# Patient Record
Sex: MALE | Race: WHITE | HISPANIC OR LATINO | Employment: FULL TIME | URBAN - METROPOLITAN AREA
[De-identification: names, ages, dates, MRNs, and addresses within clinical notes are randomized per-mention and may not be internally consistent; named-entity substitution may affect disease eponyms.]

---

## 2019-11-12 ENCOUNTER — OFFICE VISIT (OUTPATIENT)
Dept: FAMILY MEDICINE | Facility: CLINIC | Age: 44
End: 2019-11-12
Payer: OTHER GOVERNMENT

## 2019-11-12 VITALS
OXYGEN SATURATION: 98 % | WEIGHT: 246.19 LBS | RESPIRATION RATE: 16 BRPM | TEMPERATURE: 97 F | SYSTOLIC BLOOD PRESSURE: 140 MMHG | HEART RATE: 82 BPM | BODY MASS INDEX: 33.35 KG/M2 | DIASTOLIC BLOOD PRESSURE: 92 MMHG | HEIGHT: 72 IN

## 2019-11-12 DIAGNOSIS — E11.9 TYPE 2 DIABETES MELLITUS WITHOUT COMPLICATION, WITHOUT LONG-TERM CURRENT USE OF INSULIN: ICD-10-CM

## 2019-11-12 DIAGNOSIS — E78.5 HYPERLIPIDEMIA, UNSPECIFIED HYPERLIPIDEMIA TYPE: ICD-10-CM

## 2019-11-12 DIAGNOSIS — E55.9 VITAMIN D DEFICIENCY: ICD-10-CM

## 2019-11-12 DIAGNOSIS — E03.9 HYPOTHYROIDISM, UNSPECIFIED TYPE: ICD-10-CM

## 2019-11-12 DIAGNOSIS — Z23 INFLUENZA VACCINE NEEDED: ICD-10-CM

## 2019-11-12 DIAGNOSIS — Z00.00 WELLNESS EXAMINATION: Primary | ICD-10-CM

## 2019-11-12 DIAGNOSIS — E53.8 B12 DEFICIENCY: ICD-10-CM

## 2019-11-12 DIAGNOSIS — Z76.89 ENCOUNTER TO ESTABLISH CARE: ICD-10-CM

## 2019-11-12 DIAGNOSIS — I10 HTN (HYPERTENSION), BENIGN: ICD-10-CM

## 2019-11-12 PROCEDURE — 99386 PREV VISIT NEW AGE 40-64: CPT | Mod: S$GLB,,, | Performed by: FAMILY MEDICINE

## 2019-11-12 PROCEDURE — 99386 PR PREVENTIVE VISIT,NEW,40-64: ICD-10-PCS | Mod: S$GLB,,, | Performed by: FAMILY MEDICINE

## 2019-11-12 NOTE — PROGRESS NOTES
Subjective:       Patient ID: Benson Lopez is a 44 y.o. male.    Chief Complaint: Establish Care (pt is here to establish care. )    45 yo M here to establish care and a wellness exam. No pMHx, no medications. Seen by PCP in [place].   Pt has no issues whatsoever. Pt's BP is slightly elevated today but he usually has well controlled BP. On second take, his BP has normalized although they are different on bilateral arms (ie right: 130/80, left: 120/80) He usually runs and works out and his HR and BP are great.   Pt okay to get the flu vaccination today    Review of Systems   Constitutional: Negative for chills, fatigue and fever.   HENT: Negative for congestion, drooling, sneezing and sore throat.    Eyes: Negative for pain and visual disturbance.   Respiratory: Negative for cough and shortness of breath.    Cardiovascular: Negative for chest pain.   Gastrointestinal: Negative for abdominal pain, constipation, diarrhea and nausea.   Endocrine: Negative for cold intolerance and heat intolerance.   Genitourinary: Negative for difficulty urinating and frequency.   Musculoskeletal: Negative for myalgias.   Allergic/Immunologic: Negative for food allergies.   Neurological: Negative for seizures and headaches.   Psychiatric/Behavioral: Negative for behavioral problems.       Objective:      Physical Exam   Constitutional: He appears well-developed.   HENT:   Right Ear: External ear normal.   Left Ear: External ear normal.   Mouth/Throat: Oropharynx is clear and moist.   Eyes: Conjunctivae and EOM are normal.   Neck: Normal range of motion.   Cardiovascular: Normal rate, regular rhythm and intact distal pulses.   Pulmonary/Chest: Effort normal and breath sounds normal.   Abdominal: Soft.   Musculoskeletal: Normal range of motion.   Neurological: He is alert.   Skin: Skin is warm. Capillary refill takes less than 2 seconds.   Psychiatric: He has a normal mood and affect.   Nursing note and vitals reviewed.      Assessment:        1. Wellness examination    2. Encounter to establish care    3. Influenza vaccine needed    4. HTN (hypertension), benign    5. Hyperlipidemia, unspecified hyperlipidemia type    6. Hypothyroidism, unspecified type    7. Vitamin D deficiency    8. Type 2 diabetes mellitus without complication, without long-term current use of insulin    9. B12 deficiency        Plan:       PROBLEM ALPHONSO Knowles was seen today for establish care.    Diagnoses and all orders for this visit:    Wellness examination  -     CBC auto differential; Future  -     Comprehensive metabolic panel; Future  -     Lipid panel; Future  -     Vitamin D; Future  -     Hemoglobin A1c; Future  -     TSH; Future  -     Vitamin B12; Future  -     CBC auto differential  -     Comprehensive metabolic panel  -     Lipid panel  -     Vitamin D  -     Hemoglobin A1c  -     TSH  -     Vitamin B12    Encounter to establish care    Influenza vaccine needed  -     influenza (AFLURIA, FLULAVAL, FLUARIX QUADRIVALENT) vaccine 0.5 mL    HTN (hypertension), benign  -     CBC auto differential; Future  -     Comprehensive metabolic panel; Future  -     CBC auto differential  -     Comprehensive metabolic panel    Hyperlipidemia, unspecified hyperlipidemia type  -     Lipid panel; Future  -     Lipid panel    Hypothyroidism, unspecified type  -     TSH; Future  -     TSH    Vitamin D deficiency  -     Vitamin D; Future  -     Vitamin D    Type 2 diabetes mellitus without complication, without long-term current use of insulin  -     Hemoglobin A1c; Future  -     Hemoglobin A1c    B12 deficiency  -     Vitamin B12; Future  -     Vitamin B12

## 2019-11-14 LAB
25(OH)D3 SERPL-MCNC: 31 NG/ML
ALBUMIN SERPL BCP-MCNC: 4.3 G/DL (ref 3.4–5)
ALP SERPL-CCNC: 80 U/L (ref 45–117)
ALT SERPL W P-5'-P-CCNC: 50 U/L (ref 16–61)
ANION GAP SERPL CALC-SCNC: 7 MMOL/L (ref 3–11)
AST SERPL-CCNC: 29 U/L (ref 15–37)
BASOPHILS NFR BLD: 0 10*3/UL (ref 0–0.3)
BASOPHILS NFR SNV MANUAL: 0.5 % (ref 0–3)
BILIRUB SERPL-MCNC: 0.9 MG/DL (ref 0.2–1)
BUN SERPL-MCNC: 17 MG/DL (ref 7–18)
CALCIUM SERPL-MCNC: 9.6 MG/DL (ref 8.5–10.1)
CHLORIDE SERPL-SCNC: 103 MMOL/L (ref 98–107)
CHOLEST SERPL-MSCNC: 225 MG/DL
CO2 SERPL-SCNC: 32 MMOL/L (ref 21–32)
CREAT SERPL-MCNC: 1.17 MG/DL (ref 0.7–1.3)
EOSINOPHIL NFR BLD: 0 10*3/UL (ref 0–0.6)
EOSINOPHIL NFR SNV MANUAL: 0.5 % (ref 0–6)
ERYTHROCYTE [DISTWIDTH] IN BLOOD BY AUTOMATED COUNT: 12.7 % (ref 0–15.5)
ESTIMATED AVG GLUCOSE: 84 MG/DL
GFR ESTIMATION: > 60
GLUCOSE SERPL-MCNC: 100 MG/DL (ref 74–106)
HBA1C MFR BLD: 5.1 % (ref 4.2–6.3)
HCT VFR BLD AUTO: 45.1 % (ref 42–52)
HDLC SERPL-MCNC: 41 MG/DL
HGB BLD-MCNC: 15.7 G/DL (ref 14–18)
IMMATURE GRANULOCYTES/100 LEUKOCYTES: 0.3 % (ref 0–0.43)
IMMATURE GRANULOCYTES: 0.02 THOU/UL (ref 0–0.03)
LDLC SERPL CALC-MCNC: 109 MG/DL
LYMPHOCYTES NFR BLD: 1.7 10*3/UL (ref 1.2–4)
LYMPHOCYTES NFR SNV MANUAL: 23.4 % (ref 20–45)
MANUAL NRBC PER 100 CELLS: 0 % (ref 0–0.2)
MCH RBC QN AUTO: 33.1 PG (ref 27–32)
MCHC RBC AUTO-ENTMCNC: 34.8 % (ref 32–36)
MCV RBC AUTO: 94.9 FL (ref 80–97)
MONOCYTES NFR BLD MANUAL: 0.5 10*3/UL (ref 0.1–0.8)
MONOCYTES/100 LEUKOCYTES: 6.8 % (ref 2–10)
NEUTROPHILS NFR BLD: 5.1 10*3/UL (ref 1.4–7)
NEUTROPHILS NFR SNV MANUAL: 68.5 % (ref 50–80)
NRBC: 0 THOU/UL (ref 0–0.01)
PLATELETS: 238 10*3/UL (ref 130–400)
PMV BLD AUTO: 9.7 FL (ref 9.2–12.2)
POTASSIUM SERPL-SCNC: 4.1 MMOL/L (ref 3.5–5.1)
PROT SERPL-MCNC: 7.7 G/DL (ref 6.4–8.2)
RBC # BLD AUTO: 4.75 10*6/UL (ref 4.7–6.1)
SODIUM BLD-SCNC: 142 MMOL/L (ref 131–143)
TRIGL SERPL-MCNC: 376 MG/DL (ref 0–149)
TSH SERPL DL<=0.005 MIU/L-ACNC: 2.18 UIU/ML (ref 0.36–3.74)
VITAMIN B12: 505 PG/ML (ref 193–986)
VLDL CHOLESTEROL: 75 MG/DL
WBC # BLD: 7.4 10*3/UL (ref 4.5–10)